# Patient Record
Sex: MALE | URBAN - METROPOLITAN AREA
[De-identification: names, ages, dates, MRNs, and addresses within clinical notes are randomized per-mention and may not be internally consistent; named-entity substitution may affect disease eponyms.]

---

## 2023-05-22 ENCOUNTER — TELEPHONE (OUTPATIENT)
Dept: NEUROLOGY | Facility: CLINIC | Age: 68
End: 2023-05-22

## 2023-05-22 NOTE — TELEPHONE ENCOUNTER
Patient calling to schedule new patient appointment for neuropathy and weakness of hands and arms  No testing done  Triage intake sent

## 2023-05-24 NOTE — TELEPHONE ENCOUNTER
1st attempt to schedule from triage  No answer  Message on machine stating call block was active and no way to leave message

## 2023-07-21 ENCOUNTER — TELEPHONE (OUTPATIENT)
Dept: GASTROENTEROLOGY | Facility: CLINIC | Age: 68
End: 2023-07-21

## 2023-07-21 NOTE — TELEPHONE ENCOUNTER
Called patient lmom for patient to schedule office visit - Referral from Dr. Surinder Forde office. - Dr. Milligan Hidden did not put reason for referral on referral.